# Patient Record
Sex: FEMALE | Race: BLACK OR AFRICAN AMERICAN | NOT HISPANIC OR LATINO | Employment: UNEMPLOYED | URBAN - METROPOLITAN AREA
[De-identification: names, ages, dates, MRNs, and addresses within clinical notes are randomized per-mention and may not be internally consistent; named-entity substitution may affect disease eponyms.]

---

## 2024-01-08 ENCOUNTER — APPOINTMENT (OUTPATIENT)
Dept: CT IMAGING | Facility: HOSPITAL | Age: 64
End: 2024-01-08

## 2024-01-08 ENCOUNTER — HOSPITAL ENCOUNTER (EMERGENCY)
Facility: HOSPITAL | Age: 64
Discharge: HOME | End: 2024-01-08
Attending: EMERGENCY MEDICINE | Admitting: EMERGENCY MEDICINE

## 2024-01-08 VITALS
RESPIRATION RATE: 18 BRPM | SYSTOLIC BLOOD PRESSURE: 145 MMHG | TEMPERATURE: 97.3 F | HEIGHT: 59 IN | DIASTOLIC BLOOD PRESSURE: 96 MMHG | HEART RATE: 75 BPM | OXYGEN SATURATION: 99 % | WEIGHT: 118.39 LBS | BODY MASS INDEX: 23.87 KG/M2

## 2024-01-08 DIAGNOSIS — R10.9 ACUTE RIGHT FLANK PAIN: Primary | ICD-10-CM

## 2024-01-08 DIAGNOSIS — N39.0 URINARY TRACT INFECTION WITHOUT HEMATURIA, SITE UNSPECIFIED: ICD-10-CM

## 2024-01-08 LAB
ALBUMIN SERPL-MCNC: 4.5 G/DL (ref 3.5–5.2)
ALBUMIN/GLOB SERPL: 1.7 G/DL
ALP SERPL-CCNC: 111 U/L (ref 39–117)
ALT SERPL W P-5'-P-CCNC: 17 U/L (ref 1–33)
ANION GAP SERPL CALCULATED.3IONS-SCNC: 10.3 MMOL/L (ref 5–15)
AST SERPL-CCNC: 15 U/L (ref 1–32)
BACTERIA UR QL AUTO: ABNORMAL /HPF
BASOPHILS # BLD AUTO: 0.05 10*3/MM3 (ref 0–0.2)
BASOPHILS NFR BLD AUTO: 0.6 % (ref 0–1.5)
BILIRUB SERPL-MCNC: 0.6 MG/DL (ref 0–1.2)
BILIRUB UR QL STRIP: NEGATIVE
BUN SERPL-MCNC: 7 MG/DL (ref 8–23)
BUN/CREAT SERPL: 11.7 (ref 7–25)
CALCIUM SPEC-SCNC: 9.6 MG/DL (ref 8.6–10.5)
CHLORIDE SERPL-SCNC: 104 MMOL/L (ref 98–107)
CLARITY UR: CLEAR
CO2 SERPL-SCNC: 27.7 MMOL/L (ref 22–29)
COLOR UR: YELLOW
CREAT SERPL-MCNC: 0.6 MG/DL (ref 0.57–1)
DEPRECATED RDW RBC AUTO: 38.1 FL (ref 37–54)
EGFRCR SERPLBLD CKD-EPI 2021: 99.8 ML/MIN/1.73
EOSINOPHIL # BLD AUTO: 0.06 10*3/MM3 (ref 0–0.4)
EOSINOPHIL NFR BLD AUTO: 0.7 % (ref 0.3–6.2)
ERYTHROCYTE [DISTWIDTH] IN BLOOD BY AUTOMATED COUNT: 12.5 % (ref 12.3–15.4)
GLOBULIN UR ELPH-MCNC: 2.7 GM/DL
GLUCOSE SERPL-MCNC: 93 MG/DL (ref 65–99)
GLUCOSE UR STRIP-MCNC: NEGATIVE MG/DL
HCT VFR BLD AUTO: 43.1 % (ref 34–46.6)
HGB BLD-MCNC: 14.1 G/DL (ref 12–15.9)
HGB UR QL STRIP.AUTO: NEGATIVE
HYALINE CASTS UR QL AUTO: ABNORMAL /LPF
IMM GRANULOCYTES # BLD AUTO: 0.02 10*3/MM3 (ref 0–0.05)
IMM GRANULOCYTES NFR BLD AUTO: 0.2 % (ref 0–0.5)
KETONES UR QL STRIP: NEGATIVE
LEUKOCYTE ESTERASE UR QL STRIP.AUTO: ABNORMAL
LIPASE SERPL-CCNC: 27 U/L (ref 13–60)
LYMPHOCYTES # BLD AUTO: 2.67 10*3/MM3 (ref 0.7–3.1)
LYMPHOCYTES NFR BLD AUTO: 29.4 % (ref 19.6–45.3)
MCH RBC QN AUTO: 28 PG (ref 26.6–33)
MCHC RBC AUTO-ENTMCNC: 32.7 G/DL (ref 31.5–35.7)
MCV RBC AUTO: 85.5 FL (ref 79–97)
MONOCYTES # BLD AUTO: 0.71 10*3/MM3 (ref 0.1–0.9)
MONOCYTES NFR BLD AUTO: 7.8 % (ref 5–12)
NEUTROPHILS NFR BLD AUTO: 5.57 10*3/MM3 (ref 1.7–7)
NEUTROPHILS NFR BLD AUTO: 61.3 % (ref 42.7–76)
NITRITE UR QL STRIP: NEGATIVE
NRBC BLD AUTO-RTO: 0 /100 WBC (ref 0–0.2)
PH UR STRIP.AUTO: 6.5 [PH] (ref 5–8)
PLATELET # BLD AUTO: 342 10*3/MM3 (ref 140–450)
PMV BLD AUTO: 10.1 FL (ref 6–12)
POTASSIUM SERPL-SCNC: 4.3 MMOL/L (ref 3.5–5.2)
PROT SERPL-MCNC: 7.2 G/DL (ref 6–8.5)
PROT UR QL STRIP: NEGATIVE
RBC # BLD AUTO: 5.04 10*6/MM3 (ref 3.77–5.28)
RBC # UR STRIP: ABNORMAL /HPF
REF LAB TEST METHOD: ABNORMAL
SODIUM SERPL-SCNC: 142 MMOL/L (ref 136–145)
SP GR UR STRIP: 1.01 (ref 1–1.03)
SQUAMOUS #/AREA URNS HPF: ABNORMAL /HPF
TROPONIN T SERPL HS-MCNC: 12 NG/L
UROBILINOGEN UR QL STRIP: ABNORMAL
WBC # UR STRIP: ABNORMAL /HPF
WBC NRBC COR # BLD AUTO: 9.08 10*3/MM3 (ref 3.4–10.8)

## 2024-01-08 PROCEDURE — 80053 COMPREHEN METABOLIC PANEL: CPT | Performed by: EMERGENCY MEDICINE

## 2024-01-08 PROCEDURE — 81001 URINALYSIS AUTO W/SCOPE: CPT | Performed by: EMERGENCY MEDICINE

## 2024-01-08 PROCEDURE — 84484 ASSAY OF TROPONIN QUANT: CPT | Performed by: EMERGENCY MEDICINE

## 2024-01-08 PROCEDURE — 85025 COMPLETE CBC W/AUTO DIFF WBC: CPT | Performed by: EMERGENCY MEDICINE

## 2024-01-08 PROCEDURE — 93005 ELECTROCARDIOGRAM TRACING: CPT | Performed by: EMERGENCY MEDICINE

## 2024-01-08 PROCEDURE — 25510000001 IOPAMIDOL 61 % SOLUTION: Performed by: EMERGENCY MEDICINE

## 2024-01-08 PROCEDURE — 99285 EMERGENCY DEPT VISIT HI MDM: CPT

## 2024-01-08 PROCEDURE — 83690 ASSAY OF LIPASE: CPT | Performed by: EMERGENCY MEDICINE

## 2024-01-08 PROCEDURE — 74177 CT ABD & PELVIS W/CONTRAST: CPT

## 2024-01-08 RX ORDER — SODIUM CHLORIDE 0.9 % (FLUSH) 0.9 %
10 SYRINGE (ML) INJECTION AS NEEDED
Status: DISCONTINUED | OUTPATIENT
Start: 2024-01-08 | End: 2024-01-08 | Stop reason: HOSPADM

## 2024-01-08 RX ORDER — CEPHALEXIN 500 MG/1
500 CAPSULE ORAL 2 TIMES DAILY
Qty: 10 CAPSULE | Refills: 0 | Status: SHIPPED | OUTPATIENT
Start: 2024-01-08 | End: 2024-01-13

## 2024-01-08 RX ADMIN — IOPAMIDOL 85 ML: 612 INJECTION, SOLUTION INTRAVENOUS at 15:22

## 2024-01-08 NOTE — DISCHARGE INSTRUCTIONS
Take medication as prescribed.  Return immediately for any worsened pain, uncontrolled nausea or vomiting, fever over 100.4.  Follow-up with your doctor for further evaluation.

## 2024-01-08 NOTE — ED PROVIDER NOTES
EMERGENCY DEPARTMENT ENCOUNTER    Room Number:  34/34  PCP: Provider, No Known  Independent Historians: Patient and Family    HPI:  Chief Complaint: had concerns including abdominal swelling and knee swelling.      A complete HPI/ROS/PMH/PSH/SH/FH are unobtainable due to: Language barrier    Chronic or social conditions impacting patient care (Social Determinants of Health): None      Context: Stevie Aguiar is a 63 y.o. female with no known past medical history who presents to the ED c/o acute right flank pain.  The patient reports for the last 2 weeks she has had pain in her right flank that radiates to her right lower quadrant.  She states she has noticed swelling in her right abdomen, right knee, right labia for the same amount of time.  She denies any nausea or vomiting.  She has not had any treatment for her symptoms.  She reports she went to the urgent care center today and was directed here for further evaluation.  She denies any diarrhea.  She does report some pressure-like feeling in her upper back but denies any chest pain or shortness of breath.  She denies any dysuria or hematuria.        Review of prior external notes (non-ED) -and- Review of prior external test results outside of this encounter: Extensive review of the EPIC system as well as [facility] reveals no prior visit notes and no prior diagnostic studies available for review.    Prescription drug monitoring program review:         PAST MEDICAL HISTORY  Active Ambulatory Problems     Diagnosis Date Noted    No Active Ambulatory Problems     Resolved Ambulatory Problems     Diagnosis Date Noted    No Resolved Ambulatory Problems     No Additional Past Medical History         PAST SURGICAL HISTORY  History reviewed. No pertinent surgical history.      FAMILY HISTORY  History reviewed. No pertinent family history.      SOCIAL HISTORY  Social History     Socioeconomic History    Marital status:          ALLERGIES  Patient has no known  allergies.        REVIEW OF SYSTEMS  Review of Systems  Included in HPI  All systems reviewed and negative except for those discussed in HPI.      PHYSICAL EXAM    I have reviewed the triage vital signs and nursing notes.    ED Triage Vitals   Temp Heart Rate Resp BP SpO2   01/08/24 1212 01/08/24 1212 01/08/24 1212 01/08/24 1229 01/08/24 1212   97.3 °F (36.3 °C) 84 18 148/95 96 %      Temp src Heart Rate Source Patient Position BP Location FiO2 (%)   01/08/24 1212 01/08/24 1212 -- -- --   Tympanic Monitor          Physical Exam  GENERAL: Awake, alert, no acute distress  SKIN: Warm, dry  HENT: Normocephalic, atraumatic  EYES: no scleral icterus  CV: regular rhythm, regular rate  RESPIRATORY: normal effort, lungs clear  ABDOMEN: soft, mild tenderness in the right lower quadrant, nondistended.  No asymmetric swelling  MUSCULOSKELETAL: no deformity.  There is no calf tenderness or swelling on either lower extremity.  NEURO: alert, moves all extremities, follows commands      NIH:                                                             LAB RESULTS  Recent Results (from the past 24 hour(s))   POCT URINALYSIS DIPSTICK, AUTOMATED    Collection Time: 01/08/24 10:06 AM    Specimen: Urine    Specimen type and source: Urine, Urine   Result Value Ref Range    Color, UA Yellow     Appearance, Fluid Clear Clear, Slightly Cloudy    Specific Gravity, UA 1.020 1.000 - 1.030    pH, UA 5 5 - 8    Leukocytes, UA Negative Negative    Nitrite, UA Negative Negative    Total Protein, urine Negative Negative, Trace    Glucose Normal Normal    External Ketones, Urine Negative Negative    Urobilinogen, UA Normal Normal mg/dL    External Bilirubin, Urine Negative Negative    Blood, UA Negative Negative    QC Acceptable Acceptable    Lot Number [id number]     Expiration Date 8/31/24     Comment     Comprehensive Metabolic Panel    Collection Time: 01/08/24  2:04 PM    Specimen: Blood   Result Value Ref Range    Glucose 93 65 - 99 mg/dL     BUN 7 (L) 8 - 23 mg/dL    Creatinine 0.60 0.57 - 1.00 mg/dL    Sodium 142 136 - 145 mmol/L    Potassium 4.3 3.5 - 5.2 mmol/L    Chloride 104 98 - 107 mmol/L    CO2 27.7 22.0 - 29.0 mmol/L    Calcium 9.6 8.6 - 10.5 mg/dL    Total Protein 7.2 6.0 - 8.5 g/dL    Albumin 4.5 3.5 - 5.2 g/dL    ALT (SGPT) 17 1 - 33 U/L    AST (SGOT) 15 1 - 32 U/L    Alkaline Phosphatase 111 39 - 117 U/L    Total Bilirubin 0.6 0.0 - 1.2 mg/dL    Globulin 2.7 gm/dL    A/G Ratio 1.7 g/dL    BUN/Creatinine Ratio 11.7 7.0 - 25.0    Anion Gap 10.3 5.0 - 15.0 mmol/L    eGFR 99.8 >60.0 mL/min/1.73   Lipase    Collection Time: 01/08/24  2:04 PM    Specimen: Blood   Result Value Ref Range    Lipase 27 13 - 60 U/L   CBC Auto Differential    Collection Time: 01/08/24  2:04 PM    Specimen: Blood   Result Value Ref Range    WBC 9.08 3.40 - 10.80 10*3/mm3    RBC 5.04 3.77 - 5.28 10*6/mm3    Hemoglobin 14.1 12.0 - 15.9 g/dL    Hematocrit 43.1 34.0 - 46.6 %    MCV 85.5 79.0 - 97.0 fL    MCH 28.0 26.6 - 33.0 pg    MCHC 32.7 31.5 - 35.7 g/dL    RDW 12.5 12.3 - 15.4 %    RDW-SD 38.1 37.0 - 54.0 fl    MPV 10.1 6.0 - 12.0 fL    Platelets 342 140 - 450 10*3/mm3    Neutrophil % 61.3 42.7 - 76.0 %    Lymphocyte % 29.4 19.6 - 45.3 %    Monocyte % 7.8 5.0 - 12.0 %    Eosinophil % 0.7 0.3 - 6.2 %    Basophil % 0.6 0.0 - 1.5 %    Immature Grans % 0.2 0.0 - 0.5 %    Neutrophils, Absolute 5.57 1.70 - 7.00 10*3/mm3    Lymphocytes, Absolute 2.67 0.70 - 3.10 10*3/mm3    Monocytes, Absolute 0.71 0.10 - 0.90 10*3/mm3    Eosinophils, Absolute 0.06 0.00 - 0.40 10*3/mm3    Basophils, Absolute 0.05 0.00 - 0.20 10*3/mm3    Immature Grans, Absolute 0.02 0.00 - 0.05 10*3/mm3    nRBC 0.0 0.0 - 0.2 /100 WBC   High Sensitivity Troponin T    Collection Time: 01/08/24  2:04 PM    Specimen: Blood   Result Value Ref Range    HS Troponin T 12 <14 ng/L   ECG 12 Lead Chest Pain    Collection Time: 01/08/24  2:11 PM   Result Value Ref Range    QT Interval 345 ms    QTC Interval 398 ms    Urinalysis With Microscopic If Indicated (No Culture) - Urine, Clean Catch    Collection Time: 01/08/24  2:57 PM    Specimen: Urine, Clean Catch   Result Value Ref Range    Color, UA Yellow Yellow, Straw    Appearance, UA Clear Clear    pH, UA 6.5 5.0 - 8.0    Specific Gravity, UA 1.006 1.005 - 1.030    Glucose, UA Negative Negative    Ketones, UA Negative Negative    Bilirubin, UA Negative Negative    Blood, UA Negative Negative    Protein, UA Negative Negative    Leuk Esterase, UA Trace (A) Negative    Nitrite, UA Negative Negative    Urobilinogen, UA 0.2 E.U./dL 0.2 - 1.0 E.U./dL   Urinalysis, Microscopic Only - Urine, Clean Catch    Collection Time: 01/08/24  2:57 PM    Specimen: Urine, Clean Catch   Result Value Ref Range    RBC, UA 0-2 None Seen, 0-2 /HPF    WBC, UA 3-5 (A) None Seen, 0-2 /HPF    Bacteria, UA None Seen None Seen /HPF    Squamous Epithelial Cells, UA 0-2 None Seen, 0-2 /HPF    Hyaline Casts, UA None Seen None Seen /LPF    Methodology Automated Microscopy          RADIOLOGY  CT Abdomen Pelvis With Contrast    Result Date: 1/8/2024  CT ABDOMEN AND PELVIS WITH IV CONTRAST  HISTORY: Acute right flank pain with radicular symptoms into the right lower quadrant.  TECHNIQUE:  CT includes axial imaging from the lung bases to the trochanters with intravenous contrast and without use of oral contrast. Data reconstructed in coronal and sagittal planes. Radiation dose reduction techniques were utilized, including automated exposure control and exposure modulation based on body size.  COMPARISON: None.  FINDINGS: There is dependent lower lobe atelectasis. Mild diffuse fat infiltration is present in the liver. There has been previous cholecystectomy. Splenic size appears normal. Adrenal glands, pancreas, and kidneys appear within normal limits. There is no hydronephrosis.  There is no evidence for appendicitis. There is no bowel dilatation or evidence for bowel obstruction.  There is no ascites or evidence  for intra-abdominal abscess formation. No lyla enlargement is evident within the abdomen or pelvis.  Atherosclerotic calcifications are present involving the abdominal aorta and iliac vasculature.  There is transitional lumbosacral anatomy. Degenerative disc disease is present with disc space narrowing and facet arthritis within the lower lumbar spine.      1. No evidence for acute abnormality in the abdomen or pelvis in patient with previous cholecystectomy. 2. Mild diffuse fat infiltration of the liver.  Radiation dose reduction techniques were utilized, including automated exposure control and exposure modulation based on body size.   This report was finalized on 1/8/2024 3:52 PM by Dr. Marco A Mei M.D on Workstation: [id number]         MEDICATIONS GIVEN IN ER  Medications   sodium chloride 0.9 % flush 10 mL (has no administration in time range)   iopamidol (ISOVUE-300) 61 % injection 100 mL (85 mL Intravenous Given by Other 1/8/24 1522)         ORDERS PLACED DURING THIS VISIT:  Orders Placed This Encounter   Procedures    CT Abdomen Pelvis With Contrast    Comprehensive Metabolic Panel    Urinalysis With Microscopic If Indicated (No Culture) - Urine, Clean Catch    Lipase    CBC Auto Differential    High Sensitivity Troponin T    Urinalysis, Microscopic Only - Urine, Clean Catch    Ambulatory Referral to Family Practice    ECG 12 Lead Chest Pain    Insert Peripheral IV    CBC & Differential         OUTPATIENT MEDICATION MANAGEMENT:  Current Facility-Administered Medications Ordered in Epic   Medication Dose Route Frequency Provider Last Rate Last Admin    sodium chloride 0.9 % flush 10 mL  10 mL Intravenous PRN Delgado Mcbride MD         Current Outpatient Medications Ordered in Epic   Medication Sig Dispense Refill    cephalexin (KEFLEX) 500 MG capsule Take 1 capsule by mouth 2 (Two) Times a Day for 5 days. 10 capsule 0         PROCEDURES  Procedures            PROGRESS, DATA ANALYSIS, CONSULTS, AND  MEDICAL DECISION MAKING  All labs have been independently interpreted by me.  All radiology studies have been reviewed by me. All EKG's have been independently viewed and interpreted by me.  Discussion below represents my analysis of pertinent findings related to patient's condition, differential diagnosis, treatment plan and final disposition.    Differential diagnosis includes but is not limited to appendicitis, pyelonephritis, cholecystitis, kidney stone CHF.    Clinical Scores:                  ED Course as of 01/08/24 1559   Mon Jan 08, 2024   1440 EKG          EKG time: 1411  Rhythm/Rate: Normal sinus, rate 80  P waves and AR: Normal P, normal AR  QRS, axis: Narrow QRS, normal axis  ST and T waves: No acute    Independently Interpreted by me  No prior EKG available for comparison   [TR]   1503 Examination of the patient's groin at the bedside with the nurse shows no labial swelling.  No abdominal swelling.  No asymmetry. [TR]   1526 CT Abdomen Pelvis With Contrast  My independent interpretation of the CT of the abdomen pelvis is no free air.  No obstruction. [TR]   1552 Laboratory evaluation is normal except for 3-5 white blood cells in the urine without bacteria.  She has normal CBC, normal CMP, normal lipase.  She has normal troponin and does not need repeat testing given the time since the onset of her symptoms.  Her CT of the abdomen pelvis shows no acute process.  Given her pain in her right flank that radiates down into her right side as well as the leukocytes in her urine without squamous epithelial cells, plan to treat her for urinary tract infection. [TR]   1556 I updated the patient and family at the bedside.  Answered all questions.  They are agreeable to the plan. [TR]      ED Course User Index  [TR] Delgado Mcbride MD             AS OF 15:59 EST VITALS:    BP - 147/77  HR - 75  TEMP - 97.3 °F (36.3 °C) (Tympanic)  O2 SATS - 98%      COMPLEXITY OF CARE    I considered admission however after  careful review of the patient's presentation, lab results, imaging I feel that she can safely be discharged home with follow-up as an outpatient.      DIAGNOSIS  Final diagnoses:   Acute right flank pain   Urinary tract infection without hematuria, site unspecified         DISPOSITION  ED Disposition       ED Disposition   Discharge    Condition   Stable    Comment   --                Please note that portions of this document were completed with a voice recognition program.    Note Disclaimer: At [facility], we believe that sharing information builds trust and better relationships. You are receiving this note because you recently visited [facility]. It is possible you will see health information before a provider has talked with you about it. This kind of information can be easy to misunderstand. To help you fully understand what it means for your health, we urge you to discuss this note with your provider.         Delgado Mcbride MD  01/08/24 7995

## 2024-01-09 LAB
QT INTERVAL: 345 MS
QTC INTERVAL: 398 MS

## 2024-01-17 ENCOUNTER — OFFICE VISIT (OUTPATIENT)
Dept: INTERNAL MEDICINE | Facility: CLINIC | Age: 64
End: 2024-01-17

## 2024-01-17 VITALS
OXYGEN SATURATION: 99 % | HEART RATE: 81 BPM | HEIGHT: 69 IN | SYSTOLIC BLOOD PRESSURE: 140 MMHG | DIASTOLIC BLOOD PRESSURE: 84 MMHG | BODY MASS INDEX: 17.51 KG/M2 | TEMPERATURE: 98.7 F | WEIGHT: 118.2 LBS

## 2024-01-17 DIAGNOSIS — N39.0 URINARY TRACT INFECTION WITHOUT HEMATURIA, SITE UNSPECIFIED: Primary | ICD-10-CM

## 2024-01-17 DIAGNOSIS — Z76.89 ENCOUNTER TO ESTABLISH CARE: ICD-10-CM

## 2024-01-17 LAB
BILIRUB BLD-MCNC: NEGATIVE MG/DL
CLARITY, POC: CLEAR
COLOR UR: YELLOW
EXPIRATION DATE: NORMAL
GLUCOSE UR STRIP-MCNC: NEGATIVE MG/DL
KETONES UR QL: NEGATIVE
LEUKOCYTE EST, POC: NEGATIVE
Lab: NORMAL
NITRITE UR-MCNC: NEGATIVE MG/ML
PH UR: 5 [PH] (ref 5–8)
PROT UR STRIP-MCNC: NEGATIVE MG/DL
RBC # UR STRIP: NEGATIVE /UL
SP GR UR: 1 (ref 1–1.03)
UROBILINOGEN UR QL: NORMAL

## 2024-01-17 NOTE — PROGRESS NOTES
"Chief Complaint  Establish Care (Having UTI sx, went to ER 1/8 )    Subjective        Stevie Aguiar presents to [facility] PR[facility]  History of Present Illness  Ms Aguiar is an extremely pleasant [place] woman who is here today to establish care. She is with her son who is translating for her. [place] was [place] for [place] UTI.  Per Records she presents to the ED c/o acute right flank pain.  The patient reports for the last 2 weeks she has had pain in her right flank that radiates to her right lower quadrant.  She states she has noticed swelling in her right abdomen, right knee, right labia for the same amount of time.  She denies any nausea or vomiting.  She has not had any treatment for her symptoms.  She reports she went to the urgent care center today and was directed here for further evaluation.  She denies any diarrhea.  She does report some pressure-like feeling in her upper back but denies any chest pain or shortness of breath.  She denies any dysuria or hematuria. She was treated with Keflex and has completed the medication.  Today, she denies dysuria and hematuria. She denies flank pain and is symptom free. She added she mostly had pressure in her abdomen was her main symptom and it has since resolved. Her son is very active in her care and wanting to return once insurance is established to complete her full physical examination. Ms Aguiar is in agreeance today. I have educated both patient and son on avoiding salts foods, increasing fluid intake and return for fasting labs with full physical examination.       Objective   Vital Signs:  /84 (BP Location: Right arm, Patient Position: Sitting, Cuff Size: Adult)   Pulse 81   Temp 98.7 °F (37.1 °C)   Ht 175.3 cm (69\")   Wt 53.6 kg (118 lb 3.2 oz)   SpO2 99%   BMI 17.46 kg/m²   Estimated body mass index is 17.46 kg/m² as calculated from the following:    Height as of this encounter: 175.3 cm " "(69\").    Weight as of this encounter: 53.6 kg (118 lb 3.2 oz).             Physical Exam  Vitals reviewed.   Constitutional:       General: She is awake.      Appearance: Normal appearance.   HENT:      Head: Normocephalic.   Eyes:      General: Lids are normal.   Cardiovascular:      Rate and Rhythm: Normal rate and regular rhythm.      Pulses: Normal pulses.      Heart sounds: Normal heart sounds.   Pulmonary:      Effort: Pulmonary effort is normal.      Breath sounds: Normal breath sounds.   Abdominal:      General: Abdomen is flat. Bowel sounds are normal.      Palpations: Abdomen is soft.      Tenderness: There is no abdominal tenderness. There is no right CVA tenderness or left CVA tenderness.   Musculoskeletal:      Right lower leg: No edema.      Left lower leg: No edema.   Lymphadenopathy:      Head:      Right side of head: No submental, submandibular, tonsillar, preauricular or posterior auricular adenopathy.      Left side of head: No submental, submandibular, tonsillar, preauricular or posterior auricular adenopathy.      Cervical: No cervical adenopathy.   Skin:     General: Skin is warm and dry.      Capillary Refill: Capillary refill takes less than 2 seconds.   Neurological:      General: No focal deficit present.      Mental Status: She is alert and oriented to person, place, and time.   Psychiatric:         Attention and Perception: Attention normal.         Mood and Affect: Mood normal.         Behavior: Behavior normal.         Thought Content: Thought content normal.         Cognition and Memory: Cognition normal.         Judgment: Judgment normal.        Result Review :    The following data was reviewed by: LUL Youssef on 01/17/2024:  UA          1/8/2024    10:06 1/8/2024    14:57 1/17/2024    15:15   Urinalysis   Squamous Epithelial Cells, UA  0-2     Specific Gravity, UA 1.020     1.006     Ketones, UA  Negative  Negative    Blood, UA  Negative     Leukocytes, UA  Trace  " Negative    Nitrite, UA Negative     Negative     RBC, UA  0-2     WBC, UA  3-5     Bacteria, UA  None Seen        Details          This result is from an external source.             Data reviewed : 01/17/24             Assessment and Plan     Diagnoses and all orders for this visit:    1. Urinary tract infection without hematuria, site unspecified (Primary)  -     POC Urinalysis Dipstick, Automated    2. Encounter to establish care           I spent 38 minutes caring for Stevie on this date of service. This time includes time spent by me in the following activities:preparing for the visit, reviewing tests, obtaining and/or reviewing a separately obtained history, performing a medically appropriate examination and/or evaluation , counseling and educating the patient/family/caregiver, referring and communicating with other health care professionals , documenting information in the medical record, independently interpreting results and communicating that information with the patient/family/caregiver, care coordination, and educating patient and son on process of physical examination and future plan of care  Follow Up     Return if symptoms worsen or fail to improve, for Annual physical.  Patient was given instructions and counseling regarding her condition or for health maintenance advice. Please see specific information pulled into the AVS if appropriate.

## 2024-01-20 NOTE — PATIENT INSTRUCTIONS
Urinary Tract Infection, Adult    A urinary tract infection (UTI) is an infection of any part of the urinary tract. The urinary tract includes the kidneys, ureters, bladder, and urethra. These organs make, store, and get rid of urine in the body.  An upper UTI affects the ureters and kidneys. A lower UTI affects the bladder and urethra.  What are the causes?  Most urinary tract infections are caused by bacteria in your genital area around your urethra, where urine leaves your body. These bacteria grow and cause inflammation of your urinary tract.  What increases the risk?  You are more likely to develop this condition if:  You have a urinary catheter that stays in place.  You are not able to control when you urinate or have a bowel movement (incontinence).  You are female and you:  Use a spermicide or diaphragm for birth control.  Have low estrogen levels.  Are pregnant.  You have certain genes that increase your risk.  You are sexually active.  You take antibiotic medicines.  You have a condition that causes your flow of urine to slow down, such as:  An enlarged prostate, if you are male.  Blockage in your urethra.  A kidney stone.  A nerve condition that affects your bladder control (neurogenic bladder).  Not getting enough to drink, or not urinating often.  You have certain medical conditions, such as:  Diabetes.  A weak disease-fighting system (immunesystem).  Sickle cell disease.  Gout.  Spinal cord injury.  What are the signs or symptoms?  Symptoms of this condition include:  Needing to urinate right away (urgency).  Frequent urination. This may include small amounts of urine each time you urinate.  Pain or burning with urination.  Blood in the urine.  Urine that smells bad or unusual.  Trouble urinating.  Cloudy urine.  Vaginal discharge, if you are female.  Pain in the abdomen or the lower back.  You may also have:  Vomiting or a decreased appetite.  Confusion.  Irritability or tiredness.  A fever or  chills.  Diarrhea.  The first symptom in older adults may be confusion. In some cases, they may not have any symptoms until the infection has worsened.  How is this diagnosed?  This condition is diagnosed based on your medical history and a physical exam. You may also have other tests, including:  Urine tests.  Blood tests.  Tests for STIs (sexually transmitted infections).  If you have had more than one UTI, a cystoscopy or imaging studies may be done to determine the cause of the infections.  How is this treated?  Treatment for this condition includes:  Antibiotic medicine.  Over-the-counter medicines to treat discomfort.  Drinking enough water to stay hydrated.  If you have frequent infections or have other conditions such as a kidney stone, you may need to see a health care provider who specializes in the urinary tract (urologist).  In rare cases, urinary tract infections can cause sepsis. Sepsis is a life-threatening condition that occurs when the body responds to an infection. Sepsis is treated in the hospital with IV antibiotics, fluids, and other medicines.  Follow these instructions at home:    Medicines  Take over-the-counter and prescription medicines only as told by your health care provider.  If you were prescribed an antibiotic medicine, take it as told by your health care provider. Do not stop using the antibiotic even if you start to feel better.  General instructions  Make sure you:  Empty your bladder often and completely. Do not hold urine for long periods of time.  Empty your bladder after sex.  Wipe from front to back after urinating or having a bowel movement if you are female. Use each tissue only one time when you wipe.  Drink enough fluid to keep your urine pale yellow.  Keep all follow-up visits. This is important.  Contact a health care provider if:  Your symptoms do not get better after 1-2 days.  Your symptoms go away and then return.  Get help right away if:  You have severe pain in  your back or your lower abdomen.  You have a fever or chills.  You have nausea or vomiting.  Summary  A urinary tract infection (UTI) is an infection of any part of the urinary tract, which includes the kidneys, ureters, bladder, and urethra.  Most urinary tract infections are caused by bacteria in your genital area.  Treatment for this condition often includes antibiotic medicines.  If you were prescribed an antibiotic medicine, take it as told by your health care provider. Do not stop using the antibiotic even if you start to feel better.  Keep all follow-up visits. This is important.  This information is not intended to replace advice given to you by your health care provider. Make sure you discuss any questions you have with your health care provider.  Document Revised: 07/30/2021 Document Reviewed: 07/30/2021  Elsevier Patient Education © 2023 [employer]